# Patient Record
(demographics unavailable — no encounter records)

---

## 2025-04-22 NOTE — HISTORY OF PRESENT ILLNESS
[Patient reported mammogram was normal] : Patient reported mammogram was normal [Patient reported PAP Smear was normal] : Patient reported PAP Smear was normal [Patient reported colonoscopy was normal] : Patient reported colonoscopy was normal [N] : Patient reports normal menses [Condoms] : uses condoms [Y] : Positive pregnancy history [Mammogramdate] : 2023 [PapSmeardate] : 02/2025 [ColonoscopyDate] : 2023 [LMPDate] : 04/08/25 [MensesFreq] : 25 [MensesLength] : 5 [PGxTotal] : 6 [[facility] : 2 [[facility]] : 2 [PGHxABSpont] : 4 [Regular Cycle Intervals] : periods have been regular [Frequency: Q ___ days] : menstrual periods occur approximately every [unfilled] days [Menarche Age: ____] : age at menarche was [unfilled] [FreeTextEntry1] : 04/08/25 [No] : Patient does not have concerns regarding sex [Currently Active] : currently active [Men] : men

## 2025-04-30 NOTE — HISTORY OF PRESENT ILLNESS
[Home] : at home, [unfilled] , at the time of the visit. [Medical Office: ([facility])___] : at the medical office located in  [Telehealth (audio & video)] : This visit was provided via telehealth using real-time 2-way audio visual technology. [FreeTextEntry1] : Problem 1) Fibroid / adenomyosis  Previous Testing 1) Pelvis US 25   a) uterus 14.8cm, enlarging fibroid  - intramural 9.2cm increased from 4.8cm  2) MRI pelvis with and without contrast 25   a) Ill defined low T2 signal intensity lesion in the anterior body uterus measuring 8.4x5.6x7.7cm, probably adenomyosis   b) Small 11mm subserosal fibroid in Right lower body of tuerus   c) R ovary wnl, L ovary not clearly visible.   52 yo self referred for visit to discuss fibroid uterus. She always knew she had a fibroids, but recently started to feel it through her abd and having some R sided back pain. After having and US showing increase in size of fibroids, she had an MRI showing ?adenomyosis instead. Was counseled for ASAP hysterectomy by GYN, here for second opinion.   ObGynHx: regular cycles, no hot flashes,  1  1 CS, no OCPs, condoms only PMHx: none PSHx: CS Med: vitamins All: NKDA FamHx: non contributory SocHx: no toxic habits  last mammo:  normal pt last PAP:  normal per pt last colonoscopy:  normal per pt

## 2025-04-30 NOTE — PHYSICAL EXAM
[Normal] : Mood and affect: Normal [Fully active, able to carry on all pre-disease performance without restriction] : Status 0 - Fully active, able to carry on all pre-disease performance without restriction

## 2025-04-30 NOTE — HISTORY OF PRESENT ILLNESS
[Home] : at home, [unfilled] , at the time of the visit. [Medical Office: ([facility])___] : at the medical office located in  [Telehealth (audio & video)] : This visit was provided via telehealth using real-time 2-way audio visual technology. [FreeTextEntry1] : Problem 1) Fibroid / adenomyosis  Previous Testing 1) Pelvis US 25   a) uterus 14.8cm, enlarging fibroid  - intramural 9.2cm increased from 4.8cm  2) MRI pelvis with and without contrast 25   a) Ill defined low T2 signal intensity lesion in the anterior body uterus measuring 8.4x5.6x7.7cm, probably adenomyosis   b) Small 11mm subserosal fibroid in Right lower body of tuerus   c) R ovary wnl, L ovary not clearly visible.   50 yo self referred for visit to discuss fibroid uterus. She always knew she had a fibroids, but recently started to feel it through her abd and having some R sided back pain. After having and US showing increase in size of fibroids, she had an MRI showing ?adenomyosis instead. Was counseled for ASAP hysterectomy by GYN, here for second opinion.   ObGynHx: regular cycles, no hot flashes,  1  1 CS, no OCPs, condoms only PMHx: none PSHx: CS Med: vitamins All: NKDA FamHx: non contributory SocHx: no toxic habits  last mammo:  normal pt last PAP:  normal per pt last colonoscopy:  normal per pt

## 2025-04-30 NOTE — HISTORY OF PRESENT ILLNESS
[Home] : at home, [unfilled] , at the time of the visit. [Medical Office: ([facility])___] : at the medical office located in  [Telehealth (audio & video)] : This visit was provided via telehealth using real-time 2-way audio visual technology. [FreeTextEntry1] : Problem 1) Fibroid / adenomyosis  Previous Testing 1) Pelvis US 25   a) uterus 14.8cm, enlarging fibroid  - intramural 9.2cm increased from 4.8cm  2) MRI pelvis with and without contrast 25   a) Ill defined low T2 signal intensity lesion in the anterior body uterus measuring 8.4x5.6x7.7cm, probably adenomyosis   b) Small 11mm subserosal fibroid in Right lower body of tuerus   c) R ovary wnl, L ovary not clearly visible.   52 yo self referred for visit to discuss fibroid uterus. She always knew she had a fibroids, but recently started to feel it through her abd and having some R sided back pain. After having and US showing increase in size of fibroids, she had an MRI showing ?adenomyosis instead. Was counseled for ASAP hysterectomy by GYN, here for second opinion.   ObGynHx: regular cycles, no hot flashes,  1  1 CS, no OCPs, condoms only PMHx: none PSHx: CS Med: vitamins All: NKDA FamHx: non contributory SocHx: no toxic habits  last mammo:  normal pt last PAP:  normal per pt last colonoscopy:  normal per pt

## 2025-04-30 NOTE — DISCUSSION/SUMMARY
[Reviewed Clinical Lab Test(s)] : Results of clinical tests were reviewed. [Reviewed Radiology Report(s)] : Radiology reports were reviewed. [Visit Time ___ Minutes] : [unfilled] minutes [Face to Face Time___ Minutes] : with [unfilled] minutes in face to face consultation. [FreeTextEntry1] : We discussed that the MRI suggested that her uterus has adenomyosis as opposed to a fibroid. We discussed the difference between the 2 and that neither condition is actually cancer or pre-cancer. However, we will not have confirmation of the diagnosis without pathologic evaluation. Pt is still pre-menopausal and likely having response to her estrogen levels. We discussed the relationship between estrogen and fibroids, however, quickly growing fibroids are always concerning. In her care, it seems that adenomyosis might be the culprit.  We discussed the procedure of a robotic hysterectomy/BS. We discussed that doing a KEYLA and rendering the pt surgically menopausal is not needed, unless a cancer diagnosis is made. We discussed the risks - bleeding, infection, damage to the surrounding organs. We also discussed that if the uterus does not fit through the vagina, and incision might be needed to specimen extraction. We also discussed that for a large uterus, sometimes MIS is not possible and open surgery is needed.   She is thinking of when she cna have time in her life to have surgery. We discussed that if she waits past Aug 2025, i would repeat the MRI then (6 months).   Also, she had an endometrial biopsy and blood work that I do not have access to today, she will get those to me. All questions answered to the best of my ability.  [] results of EMB and blood work from primary provider - likely no slide review needed if benign [] MRI in 8/25 if no surgery [] RA TLH/BS when pt ready [] medial clearance with PCP preop